# Patient Record
Sex: FEMALE | URBAN - METROPOLITAN AREA
[De-identification: names, ages, dates, MRNs, and addresses within clinical notes are randomized per-mention and may not be internally consistent; named-entity substitution may affect disease eponyms.]

---
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
## 2018-02-03 ENCOUNTER — RECORDS - HEALTHEAST (OUTPATIENT)
Dept: ADMINISTRATIVE | Facility: OTHER | Age: 18
End: 2018-02-03

## 2018-02-03 ENCOUNTER — OFFICE VISIT - HEALTHEAST (OUTPATIENT)
Dept: FAMILY MEDICINE | Facility: CLINIC | Age: 18
End: 2018-02-03

## 2018-02-03 DIAGNOSIS — Z23 NEEDS FLU SHOT: ICD-10-CM

## 2018-12-18 ENCOUNTER — OFFICE VISIT - HEALTHEAST (OUTPATIENT)
Dept: FAMILY MEDICINE | Facility: CLINIC | Age: 18
End: 2018-12-18

## 2018-12-18 DIAGNOSIS — R10.819 SUPRAPUBIC TENDERNESS: ICD-10-CM

## 2018-12-18 DIAGNOSIS — M54.9 ACUTE MIDLINE BACK PAIN, UNSPECIFIED BACK LOCATION: ICD-10-CM

## 2018-12-18 DIAGNOSIS — R10.84 ABDOMINAL PAIN, GENERALIZED: ICD-10-CM

## 2018-12-18 LAB
ALBUMIN UR-MCNC: NEGATIVE MG/DL
APPEARANCE UR: CLEAR
BILIRUB UR QL STRIP: NEGATIVE
COLOR UR AUTO: YELLOW
GLUCOSE UR STRIP-MCNC: NEGATIVE MG/DL
HGB UR QL STRIP: NEGATIVE
KETONES UR STRIP-MCNC: NEGATIVE MG/DL
LEUKOCYTE ESTERASE UR QL STRIP: NEGATIVE
NITRATE UR QL: NEGATIVE
PH UR STRIP: 7 [PH] (ref 5–8)
SP GR UR STRIP: 1.01 (ref 1–1.03)
UROBILINOGEN UR STRIP-ACNC: NORMAL

## 2019-10-25 ENCOUNTER — RECORDS - HEALTHEAST (OUTPATIENT)
Dept: ADMINISTRATIVE | Facility: OTHER | Age: 19
End: 2019-10-25

## 2020-03-11 ENCOUNTER — HEALTH MAINTENANCE LETTER (OUTPATIENT)
Age: 20
End: 2020-03-11

## 2021-01-04 ENCOUNTER — HEALTH MAINTENANCE LETTER (OUTPATIENT)
Age: 21
End: 2021-01-04

## 2021-04-25 ENCOUNTER — HEALTH MAINTENANCE LETTER (OUTPATIENT)
Age: 21
End: 2021-04-25

## 2021-05-30 VITALS — HEIGHT: 63 IN | BODY MASS INDEX: 23.42 KG/M2 | WEIGHT: 132.2 LBS

## 2021-06-02 VITALS — WEIGHT: 131 LBS | BODY MASS INDEX: 23.39 KG/M2

## 2021-06-09 NOTE — PROGRESS NOTES
SUBJECTIVE:   Emma Galicia is a 16 y.o. female comes in for evaluation of a slight cough and rhinitis for the last 2 days.  She was exposed influenza with 2 members of the household having been diagnosed.  She has not had any fever.    She did not receive her influenza vaccination this year.    OBJECTIVE:   Appears mild distress.  Ears: normal  Eyes: no redness or discharge  Nose: no discharge  Oropharynx: mild erythema  Neck: no adenopathy  Lungs: clear to auscultation, no wheezes or rales and unlabored breathing.   Skin: no rash.    Recent Results (from the past 24 hour(s))   Influenza A/B Rapid Test   Result Value Ref Range    Influenza  A, Rapid Antigen No Influenza A antigen detected No Influenza A antigen detected    Influenza B, Rapid Antigen No Influenza B antigen detected No Influenza B antigen detected        ASSESSMENT:   Viral URI with a cough.    PLAN:   Use acetaminophen or other OTC analgesic for fevers or aches  May try OTC antihistamines and/or decongestants as well as DM for the cough  F/U prn.

## 2021-06-16 PROBLEM — J18.9 PNEUMONIA: Status: ACTIVE | Noted: 2019-10-26

## 2021-06-16 PROBLEM — J15.9 COMMUNITY ACQUIRED BACTERIAL PNEUMONIA: Status: ACTIVE | Noted: 2019-10-25

## 2021-06-22 NOTE — PROGRESS NOTES
Assessment:     1. Abdominal pain, generalized  Urinalysis-UC if Indicated   2. Acute midline back pain, unspecified back location     3. Suprapubic tenderness       Results for orders placed or performed in visit on 12/18/18   Urinalysis-UC if Indicated   Result Value Ref Range    Color, UA Yellow Colorless, Yellow, Straw, Light Yellow    Clarity, UA Clear Clear    Glucose, UA Negative Negative    Bilirubin, UA Negative Negative    Ketones, UA Negative Negative    Specific Gravity, UA 1.010 1.005 - 1.030    Blood, UA Negative Negative    pH, UA 7.0 5.0 - 8.0    Protein, UA Negative Negative mg/dL    Urobilinogen, UA 0.2 E.U./dL 0.2 E.U./dL, 1.0 E.U./dL    Nitrite, UA Negative Negative    Leukocytes, UA Negative Negative          Plan:     Differential diagnosis include but not limited to side effect to medication, UTI, low back pain without sciatica.  Discussed with the patient on exam finding, patient has low back pain advised her to use ibuprofen or Tylenol for pain or discomfort.  For suprapubic tenderness, urinalysis negative for UTI.  Advised patient to increase fluid intake, monitor for worsening symptoms.  For nausea, this could be due to side effect of her oral contraceptive medications.  Advised patient to follow-up with PCP for further evaluation.    I discussed red flag symptoms, return precautions to clinic/ER and follow up care with patient/parent.  Expected clinical course, symptomatic cares advised. Questions answered. Patient/parent amenable with plan.     Subjective:       18 y.o. female presents for evaluation of UTI symptoms.  Patient reports that when she goes to pee she noticed that her urine is light green, and has white stuff floating in it.  She has noticed this going on for about a month now.  She admits to abnormal discharge, white, and cloudy.  No itching, the smell is dirty more than usual.  She is not sexually active.  Currently taking oral contraceptive for acne to regulate her  menstrual cycle.  She also admits to pressure in her bladder area, the pressure radiates to her lower back where she has back pain.  Patient has history of chronic back pain.  She also admits to having nausea no vomiting.  This has been going on for a while now.  Also for the past month or so she has noticed that she has increased tiredness.  She started Prozac in September and for the past month she has been exhausted more than usual, needing to sleep more and feels tired all the time.  Patient has history of depression, she denies any thoughts of suicide or any thoughts of suicide ideation.  For her low back pain she does not take any medications, she denies any injury or trauma.      The following portions of the patient's history were reviewed and updated as appropriate: allergies, current medications, past family history, past medical history, past social history, past surgical history and problem list.    Review of Systems  A 12 point comprehensive review of systems was negative except as noted.      Objective:      /66 (Patient Site: Right Arm, Patient Position: Sitting, Cuff Size: Adult Regular)   Pulse 92   Temp 98  F (36.7  C) (Oral)   Resp 18   Wt 131 lb (59.4 kg)   LMP 12/09/2018 (Exact Date)   SpO2 100%   Breastfeeding? No   BMI 23.39 kg/m    General appearance: alert, appears stated age, cooperative and mild distress  Head: Normocephalic, without obvious abnormality, atraumatic, sinuses nontender to percussion  Back: tenderness to the lower back with alpation  Lungs: clear to auscultation bilaterally  Heart: regular rate and rhythm, S1, S2 normal, no murmur, click, rub or gallop  Abdomen: soft, non-tender; bowel sounds normal; no masses,  no organomegaly and suprapubic tenderness with palpation and negative for flank pain with palpation  Extremities: extremities normal, atraumatic, no cyanosis or edema  Pulses: 2+ and symmetric  Skin: Skin color, texture, turgor normal. No rashes or  lesions  Lymph nodes: Cervical, supraclavicular, and axillary nodes normal.  Neurologic: Grossly normal     This note has been dictated using voice recognition software. Any grammatical or context distortions are unintentional and inherent to the software

## 2021-10-10 ENCOUNTER — HEALTH MAINTENANCE LETTER (OUTPATIENT)
Age: 21
End: 2021-10-10

## 2022-05-22 ENCOUNTER — HEALTH MAINTENANCE LETTER (OUTPATIENT)
Age: 22
End: 2022-05-22

## 2022-09-18 ENCOUNTER — HEALTH MAINTENANCE LETTER (OUTPATIENT)
Age: 22
End: 2022-09-18

## 2023-06-04 ENCOUNTER — HEALTH MAINTENANCE LETTER (OUTPATIENT)
Age: 23
End: 2023-06-04